# Patient Record
Sex: MALE | Race: WHITE | URBAN - METROPOLITAN AREA
[De-identification: names, ages, dates, MRNs, and addresses within clinical notes are randomized per-mention and may not be internally consistent; named-entity substitution may affect disease eponyms.]

---

## 2023-01-12 ENCOUNTER — OFFICE VISIT (OUTPATIENT)
Dept: URBAN - METROPOLITAN AREA CLINIC 8 | Facility: CLINIC | Age: 73
End: 2023-01-12
Payer: COMMERCIAL

## 2023-01-12 DIAGNOSIS — Z96.1 PRESENCE OF INTRAOCULAR LENS: ICD-10-CM

## 2023-01-12 DIAGNOSIS — H26.493 OTHER SECONDARY CATARACT, BILATERAL: ICD-10-CM

## 2023-01-12 DIAGNOSIS — H10.503 BILATERAL BLEPHAROCONJUNCTIVITIS: Primary | ICD-10-CM

## 2023-01-12 PROCEDURE — 99204 OFFICE O/P NEW MOD 45 MIN: CPT | Performed by: OPHTHALMOLOGY

## 2023-01-12 RX ORDER — DOXYCYCLINE HYCLATE 50 MG/1
50 MG TABLET, FILM COATED ORAL
Qty: 50 | Refills: 3 | Status: ACTIVE
Start: 2023-01-12

## 2023-01-12 RX ORDER — NEOMYCIN SULFATE, POLYMYXIN B SULFATE AND DEXAMETHASONE 3.5; 10000; 1 MG/ML; [USP'U]/ML; MG/ML
SUSPENSION OPHTHALMIC
Qty: 10 | Refills: 2 | Status: ACTIVE
Start: 2023-01-12

## 2023-01-12 ASSESSMENT — INTRAOCULAR PRESSURE
OS: 11
OD: 11

## 2023-01-12 NOTE — IMPRESSION/PLAN
Impression: Presence of intraocular lens: Z96.1. Plan: s/p PCIOL OU. Will continue to monitor and observe. Discussed possible LASIK OS versus Glasses for best corrected vision.

## 2023-01-12 NOTE — IMPRESSION/PLAN
Impression: Bilateral blepharoconjunctivitis: H10.503. Plan: Advised patient of condition. Warm compress, eyelid scrubs twice a day. ERx sent to pharmacy today (Maxitrol BID OU and Doxycycline Hyclate BID PO) At next visit will do Lissamine test and refraction.

## 2023-02-28 ENCOUNTER — OFFICE VISIT (OUTPATIENT)
Dept: URBAN - METROPOLITAN AREA CLINIC 8 | Facility: CLINIC | Age: 73
End: 2023-02-28
Payer: COMMERCIAL

## 2023-02-28 DIAGNOSIS — H52.03 HYPERMETROPIA, BILATERAL: ICD-10-CM

## 2023-02-28 DIAGNOSIS — H02.055 TRICHIASIS WITHOUT ENTROPION LEFT LOWER EYELID: ICD-10-CM

## 2023-02-28 DIAGNOSIS — H10.503 BILATERAL BLEPHAROCONJUNCTIVITIS: Primary | ICD-10-CM

## 2023-02-28 PROCEDURE — 99213 OFFICE O/P EST LOW 20 MIN: CPT | Performed by: OPHTHALMOLOGY

## 2023-02-28 RX ORDER — OFLOXACIN 3 MG/ML
0.3 % SOLUTION/ DROPS OPHTHALMIC
Qty: 5 | Refills: 2 | Status: ACTIVE
Start: 2023-02-28

## 2023-02-28 RX ORDER — OFLOXACIN 3 MG/ML
0.3 % SOLUTION/ DROPS OPHTHALMIC
Qty: 5 | Refills: 1 | Status: INACTIVE
Start: 2023-02-28 | End: 2023-02-28

## 2023-02-28 ASSESSMENT — VISUAL ACUITY
OS: 20/20
OD: 20/20

## 2023-02-28 ASSESSMENT — INTRAOCULAR PRESSURE
OS: 24
OS: 16
OD: 26
OD: 16

## 2023-02-28 NOTE — IMPRESSION/PLAN
Impression: Bilateral blepharoconjunctivitis: H10.503. Plan: Discussed in todays follow up, diagnosis in detail with patient. Advised patient of condition. Will continue to observe condition and or symptoms. Continue with warm compresses and Lid scrubs. Continue with Gtts Maxitrol as directed. ERx sent today to pharmacy Ofloxacin BID OU.

## 2023-04-13 ENCOUNTER — OFFICE VISIT (OUTPATIENT)
Dept: URBAN - METROPOLITAN AREA CLINIC 8 | Facility: CLINIC | Age: 73
End: 2023-04-13
Payer: COMMERCIAL

## 2023-04-13 DIAGNOSIS — H10.503 BILATERAL BLEPHAROCONJUNCTIVITIS: Primary | ICD-10-CM

## 2023-04-13 PROCEDURE — 99213 OFFICE O/P EST LOW 20 MIN: CPT | Performed by: OPHTHALMOLOGY

## 2023-04-13 RX ORDER — NEOMYCIN SULFATE, POLYMYXIN B SULFATE AND DEXAMETHASONE 3.5; 10000; 1 MG/ML; [USP'U]/ML; MG/ML
SUSPENSION OPHTHALMIC
Qty: 5 | Refills: 2 | Status: ACTIVE
Start: 2023-04-13

## 2023-04-13 ASSESSMENT — INTRAOCULAR PRESSURE
OS: 12
OD: 12

## 2023-04-13 NOTE — IMPRESSION/PLAN
Impression: Bilateral blepharoconjunctivitis: H10.503. Plan: Discussed diagnosis. monitor. Advised Warm compresses 4 x a day, Lid scrubs and artificial tears during the day. Discontinue Ofloxacin RTC in 3 weeks for follow up. Maxitrol OU 3 x a day with 2 refills.

## 2023-05-04 ENCOUNTER — OFFICE VISIT (OUTPATIENT)
Dept: URBAN - METROPOLITAN AREA CLINIC 8 | Facility: CLINIC | Age: 73
End: 2023-05-04
Payer: COMMERCIAL

## 2023-05-04 DIAGNOSIS — H04.123 DRY EYE SYNDROME OF BILATERAL LACRIMAL GLANDS: ICD-10-CM

## 2023-05-04 DIAGNOSIS — H01.006 BLEPHARITIS OF LEFT EYELID: Primary | ICD-10-CM

## 2023-05-04 PROCEDURE — 99213 OFFICE O/P EST LOW 20 MIN: CPT | Performed by: OPHTHALMOLOGY

## 2023-05-04 RX ORDER — CEPHALEXIN 500 MG/1
500 MG TABLET ORAL
Qty: 50 | Refills: 0 | Status: ACTIVE
Start: 2023-05-04

## 2023-05-04 NOTE — IMPRESSION/PLAN
Impression: Blepharitis of left eyelid: H01.006. Plan: Findings discussed Continue using warm compresses and lid scrubs Initiate Keflex 500mg TID PO -- ERx to pharmacy today Continue Maxitrol drops TID
Impression: Dry eye syndrome of bilateral lacrimal glands: H04.123. Plan: Mild, advised patient of condition. Artificial tears 3-4x a day OU.
Breath sounds clear and equal bilaterally.

## 2023-05-16 ENCOUNTER — OFFICE VISIT (OUTPATIENT)
Dept: URBAN - METROPOLITAN AREA CLINIC 8 | Facility: CLINIC | Age: 73
End: 2023-05-16
Payer: COMMERCIAL

## 2023-05-16 DIAGNOSIS — H10.503 BILATERAL BLEPHAROCONJUNCTIVITIS: Primary | ICD-10-CM

## 2023-05-16 PROCEDURE — 99213 OFFICE O/P EST LOW 20 MIN: CPT | Performed by: OPHTHALMOLOGY

## 2023-05-16 RX ORDER — DOXYCYCLINE HYCLATE 50 MG/1
50 MG CAPSULE, GELATIN COATED ORAL
Qty: 30 | Refills: 2 | Status: ACTIVE
Start: 2023-05-16

## 2023-05-16 RX ORDER — OFLOXACIN 3 MG/ML
0.3 % SOLUTION/ DROPS OPHTHALMIC
Qty: 5 | Refills: 1 | Status: ACTIVE
Start: 2023-05-16

## 2023-05-16 RX ORDER — PREDNISOLONE ACETATE 10 MG/ML
1 % SUSPENSION/ DROPS OPHTHALMIC
Qty: 5 | Refills: 1 | Status: ACTIVE
Start: 2023-05-16

## 2023-05-16 NOTE — IMPRESSION/PLAN
Impression: Bilateral blepharoconjunctivitis: H10.503. Plan: Discussed diagnosis. Advised patient to continue to finish Keflex then stop. Start on Doxycycline orally and Pred-forte and Oflox gtts OS ERx sent to pharmacy. Discontinue Maxitrol gtts. Continue to use warm compresses OS. RTC in 1 month for follow up.

## 2023-06-13 ENCOUNTER — OFFICE VISIT (OUTPATIENT)
Dept: URBAN - METROPOLITAN AREA CLINIC 8 | Facility: CLINIC | Age: 73
End: 2023-06-13
Payer: COMMERCIAL

## 2023-06-13 DIAGNOSIS — H10.503 BILATERAL BLEPHAROCONJUNCTIVITIS: Primary | ICD-10-CM

## 2023-06-13 PROCEDURE — 99213 OFFICE O/P EST LOW 20 MIN: CPT | Performed by: OPHTHALMOLOGY

## 2023-06-13 ASSESSMENT — INTRAOCULAR PRESSURE
OS: 14
OD: 13

## 2023-06-13 NOTE — IMPRESSION/PLAN
Impression: Bilateral blepharoconjunctivitis: H10.503. Plan: Patient condition is improving. Discussed gtts with patient. Plan: Prednisolone and Ofloxacin twice a day OS ONLY. Continue Doxycycline Orally as directed. Return to clinic in 1 month or sooner if symptoms return.

## 2023-07-27 ENCOUNTER — OFFICE VISIT (OUTPATIENT)
Dept: URBAN - METROPOLITAN AREA CLINIC 8 | Facility: CLINIC | Age: 73
End: 2023-07-27
Payer: COMMERCIAL

## 2023-07-27 DIAGNOSIS — H10.503 BILATERAL BLEPHAROCONJUNCTIVITIS: Primary | ICD-10-CM

## 2023-07-27 PROCEDURE — 99213 OFFICE O/P EST LOW 20 MIN: CPT | Performed by: OPHTHALMOLOGY

## 2023-07-27 RX ORDER — PREDNISOLONE ACETATE 10 MG/ML
1 % SUSPENSION/ DROPS OPHTHALMIC
Qty: 5 | Refills: 1 | Status: ACTIVE
Start: 2023-07-27

## 2023-07-27 RX ORDER — OFLOXACIN 3 MG/ML
0.3 % SOLUTION/ DROPS OPHTHALMIC
Qty: 5 | Refills: 1 | Status: ACTIVE
Start: 2023-07-27

## 2023-07-27 ASSESSMENT — INTRAOCULAR PRESSURE
OS: 18
OD: 16

## 2023-07-27 ASSESSMENT — VISUAL ACUITY
OD: 20/25
OS: 20/25

## 2023-10-05 ENCOUNTER — OFFICE VISIT (OUTPATIENT)
Dept: URBAN - METROPOLITAN AREA CLINIC 8 | Facility: CLINIC | Age: 73
End: 2023-10-05
Payer: COMMERCIAL

## 2023-10-05 DIAGNOSIS — H10.503 BILATERAL BLEPHAROCONJUNCTIVITIS: ICD-10-CM

## 2023-10-05 DIAGNOSIS — H02.059 TRICHIASIS W/O ENTROPION OF EYELID: Primary | ICD-10-CM

## 2023-10-05 PROCEDURE — 99213 OFFICE O/P EST LOW 20 MIN: CPT | Performed by: OPHTHALMOLOGY

## 2023-10-05 RX ORDER — DOXYCYCLINE HYCLATE 50 MG/1
50 MG CAPSULE, GELATIN COATED ORAL
Qty: 60 | Refills: 2 | Status: ACTIVE
Start: 2023-10-05

## 2023-10-05 ASSESSMENT — INTRAOCULAR PRESSURE
OS: 17
OD: 16

## 2023-11-16 ENCOUNTER — Encounter (OUTPATIENT)
Dept: URBAN - METROPOLITAN AREA CLINIC 8 | Facility: CLINIC | Age: 73
End: 2023-11-16
Payer: COMMERCIAL

## 2023-11-16 PROCEDURE — 99213 OFFICE O/P EST LOW 20 MIN: CPT | Performed by: OPHTHALMOLOGY

## 2024-03-21 ENCOUNTER — OFFICE VISIT (OUTPATIENT)
Dept: URBAN - METROPOLITAN AREA CLINIC 42 | Facility: CLINIC | Age: 74
End: 2024-03-21
Payer: MEDICARE

## 2024-03-21 DIAGNOSIS — H02.059 TRICHIASIS W/O ENTROPION OF EYELID: Primary | ICD-10-CM

## 2024-03-21 PROCEDURE — 99213 OFFICE O/P EST LOW 20 MIN: CPT | Performed by: OPHTHALMOLOGY

## 2024-03-21 RX ORDER — DOXYCYCLINE HYCLATE 50 MG/1
50 MG CAPSULE, GELATIN COATED ORAL
Qty: 50 | Refills: 1 | Status: ACTIVE
Start: 2024-03-21

## 2024-03-21 RX ORDER — OFLOXACIN 3 MG/ML
0.3 % SOLUTION/ DROPS OPHTHALMIC
Qty: 5 | Refills: 2 | Status: ACTIVE
Start: 2024-03-21

## 2024-03-21 ASSESSMENT — INTRAOCULAR PRESSURE
OD: 16
OS: 16

## 2024-06-27 ENCOUNTER — OFFICE VISIT (OUTPATIENT)
Dept: URBAN - METROPOLITAN AREA CLINIC 42 | Facility: CLINIC | Age: 74
End: 2024-06-27
Payer: MEDICARE

## 2024-06-27 DIAGNOSIS — H01.005 UNSPECIFIED BLEPHARITIS LEFT LOWER EYELID: Primary | ICD-10-CM

## 2024-06-27 DIAGNOSIS — Z96.1 PRESENCE OF INTRAOCULAR LENS: ICD-10-CM

## 2024-06-27 PROCEDURE — 99213 OFFICE O/P EST LOW 20 MIN: CPT | Performed by: OPHTHALMOLOGY

## 2024-06-27 ASSESSMENT — INTRAOCULAR PRESSURE
OS: 11
OD: 11